# Patient Record
Sex: FEMALE | Race: WHITE | NOT HISPANIC OR LATINO | Employment: FULL TIME | URBAN - NONMETROPOLITAN AREA
[De-identification: names, ages, dates, MRNs, and addresses within clinical notes are randomized per-mention and may not be internally consistent; named-entity substitution may affect disease eponyms.]

---

## 2018-12-13 PROBLEM — I49.3 PVCS (PREMATURE VENTRICULAR CONTRACTIONS): Status: ACTIVE | Noted: 2018-12-13

## 2018-12-13 PROBLEM — I47.1 AVNRT (AV NODAL RE-ENTRY TACHYCARDIA) (HCC): Status: ACTIVE | Noted: 2018-12-13

## 2018-12-14 ENCOUNTER — CONSULT (OUTPATIENT)
Dept: CARDIOLOGY | Facility: CLINIC | Age: 53
End: 2018-12-14

## 2018-12-14 VITALS
WEIGHT: 158 LBS | SYSTOLIC BLOOD PRESSURE: 118 MMHG | BODY MASS INDEX: 25.39 KG/M2 | DIASTOLIC BLOOD PRESSURE: 76 MMHG | HEART RATE: 75 BPM | HEIGHT: 66 IN

## 2018-12-14 DIAGNOSIS — I49.3 PVCS (PREMATURE VENTRICULAR CONTRACTIONS): Primary | ICD-10-CM

## 2018-12-14 DIAGNOSIS — Z82.49 FAMILY HISTORY OF CAROTID ARTERY STENOSIS: ICD-10-CM

## 2018-12-14 DIAGNOSIS — I47.1 AVNRT (AV NODAL RE-ENTRY TACHYCARDIA) (HCC): ICD-10-CM

## 2018-12-14 PROCEDURE — 93000 ELECTROCARDIOGRAM COMPLETE: CPT | Performed by: INTERNAL MEDICINE

## 2018-12-14 PROCEDURE — 99203 OFFICE O/P NEW LOW 30 MIN: CPT | Performed by: INTERNAL MEDICINE

## 2018-12-14 RX ORDER — CONJUGATED ESTROGENS 0.3 MG/1
TABLET, FILM COATED ORAL
COMMUNITY
Start: 2018-11-08

## 2019-03-11 ENCOUNTER — HOSPITAL ENCOUNTER (OUTPATIENT)
Dept: CARDIOLOGY | Facility: HOSPITAL | Age: 54
End: 2019-03-11

## 2019-04-22 ENCOUNTER — HOSPITAL ENCOUNTER (OUTPATIENT)
Dept: CARDIOLOGY | Facility: HOSPITAL | Age: 54
End: 2019-04-22

## 2019-05-31 ENCOUNTER — TELEPHONE (OUTPATIENT)
Dept: CARDIOLOGY | Facility: CLINIC | Age: 54
End: 2019-05-31

## 2019-05-31 NOTE — TELEPHONE ENCOUNTER
Patient called to notify us that her PCP found that she is Vitamin D deficient and she is now taking VItamin D supplement 2000 IU

## 2019-12-06 ENCOUNTER — OFFICE VISIT (OUTPATIENT)
Dept: CARDIOLOGY | Facility: CLINIC | Age: 54
End: 2019-12-06

## 2019-12-06 VITALS
HEART RATE: 71 BPM | WEIGHT: 155 LBS | SYSTOLIC BLOOD PRESSURE: 125 MMHG | BODY MASS INDEX: 24.91 KG/M2 | DIASTOLIC BLOOD PRESSURE: 75 MMHG | HEIGHT: 66 IN

## 2019-12-06 DIAGNOSIS — I47.1 AVNRT (AV NODAL RE-ENTRY TACHYCARDIA) (HCC): ICD-10-CM

## 2019-12-06 DIAGNOSIS — I49.3 PVCS (PREMATURE VENTRICULAR CONTRACTIONS): Primary | ICD-10-CM

## 2019-12-06 PROCEDURE — 93000 ELECTROCARDIOGRAM COMPLETE: CPT | Performed by: INTERNAL MEDICINE

## 2019-12-06 PROCEDURE — 99213 OFFICE O/P EST LOW 20 MIN: CPT | Performed by: INTERNAL MEDICINE

## 2019-12-06 NOTE — PROGRESS NOTES
Olinda Mane  1965  [phone]    12/06/2019    [facility] CARDIOLOGY     Chirinos, Phyllis Kent, APRN  [address]  [place] [address]    Chief Complaint   Patient presents with   • pvcs       Problem List:  1.  PVCs:  a. Holter monitor, 07/19/2012, with heart rate of   BPM, average 70 BPM with no PACs or PVCs.  b. Event recorder, September 2012, with PVCs, diltiazem initiated.  c. GXT, 09/26/2012, with no ischemia.  d. Echocardiogram, 09/26/2012, with LA 3.1, EF 65%.  e. Event recorder, May 2013, with PVCs, flecainide initiated.  f. Flecainide stopped 2013  2. AVNRT:  a. 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  3. Wide complex tachycardia:  a. Left heart catheterization by Dr. Morrow, 11/21/2000, with normal RV-gram, normal coronary arteries, negative  methylergonovine provocation study.  4. Heat exhaustion, 2012.  5. GERD  6.  Anxiety.  7. Remote nephrolithiasis    Allergies  No Known Allergies    Current Medications    Current Outpatient Medications:   •  Cholecalciferol (VITAMIN D3) 125 MCG (5000 UT) capsule capsule, Take 5,000 Units by mouth Daily., Disp: , Rfl:   •  Loratadine (CLARITIN CHILDRENS PO), Take 5 mg by mouth Daily., Disp: , Rfl:   •  Misc Natural Products (ESTROVEN ENERGY PO), Take  by mouth., Disp: , Rfl:   •  PREMARIN 0.3 MG tablet, , Disp: , Rfl:   •  VITAMIN K PO, Take 100 mcg by mouth Daily., Disp: , Rfl:     History of Present Illness     Pt presents for follow up of PVCs. Since we last saw the pt, she has been doing well with only rare mild PVCs. She denies SOB, CP, LH, and dizziness, syncope. Denies any hospitalizations, ER visits, bleeding, or TIA/CVA symptoms. Overall feels well. Her PCP follows her lipids and she is doing lifestyle modification.     ROS:  General:  Denies fatigue, weight gain or loss  Cardiovascular:   "Denies CP, PND, syncope, near syncope, edema + rare palpitations.  Pulmonary:  Denies CABALLERO, cough, or wheezing      Vitals:    12/06/19 1012   BP: 125/75   BP Location: Right arm   Patient Position: Sitting   Pulse: 71   Weight: 70.3 kg (155 lb)   Height: 167.6 cm (66\")     Body mass index is 25.02 kg/m².  PE:  General: NAD. A & O x 3  Neck: no JVD, no carotid bruits, no TM  Heart RRR, NL S1, S2, S4 present, no rubs, murmurs  Lungs: CTA, no wheezes, rhonchi, or rales  Abd: soft, non-tender, NL BS  Ext: No musculoskeletal deformities, no edema, cyanosis, or clubbing  Psych: normal mood and affect    Diagnostic Data:        ECG 12 Lead  Date/Time: 12/6/2019 2:51 PM  Performed by: Chuck Cabrera MD  Authorized by: Chuck Cabrera MD   Comparison: compared with previous ECG from 12/14/2018  Similar to previous ECG  Rhythm: sinus rhythm  Rate: normal  BPM: 72    Clinical impression: normal ECG            1. PVCs (premature ventricular contractions)    2. AVNRT (AV cornelius re-entry tachycardia) (CMS/HCC)          Plan:  1. PVCs:  - previously on Flecainide, but now doing well on no AAD.   - continue lifestyle modification        2. AVNRT:  - s/p RFA in 2000  - no recurrences      F/up in 6 months    Electronically signed by YU Ferrari, 12/06/19, 10:38 AM.    "

## 2021-09-10 ENCOUNTER — OFFICE VISIT (OUTPATIENT)
Dept: CARDIOLOGY | Facility: CLINIC | Age: 56
End: 2021-09-10

## 2021-09-10 VITALS
HEART RATE: 62 BPM | HEIGHT: 66 IN | BODY MASS INDEX: 26.2 KG/M2 | DIASTOLIC BLOOD PRESSURE: 76 MMHG | WEIGHT: 163 LBS | SYSTOLIC BLOOD PRESSURE: 115 MMHG | OXYGEN SATURATION: 97 %

## 2021-09-10 DIAGNOSIS — I47.1 AVNRT (AV NODAL RE-ENTRY TACHYCARDIA) (HCC): ICD-10-CM

## 2021-09-10 DIAGNOSIS — I49.3 PVCS (PREMATURE VENTRICULAR CONTRACTIONS): Primary | ICD-10-CM

## 2021-09-10 PROCEDURE — 99213 OFFICE O/P EST LOW 20 MIN: CPT | Performed by: INTERNAL MEDICINE

## 2021-09-10 RX ORDER — PROGESTERONE 100 MG/1
100 CAPSULE ORAL DAILY
COMMUNITY
Start: 2021-08-13

## 2021-09-10 RX ORDER — PANTOPRAZOLE SODIUM 40 MG/1
40 TABLET, DELAYED RELEASE ORAL EVERY MORNING
COMMUNITY
Start: 2021-07-15

## 2021-09-10 NOTE — PROGRESS NOTES
Olinda Mane  1965  Home phone not available    09/10/2021    [facility] CARDIOLOGY     Referring Provider: No ref. provider found     Phyllis Chirinos, APRN  [address]  [place] [id number]    Chief Complaint   Patient presents with   • preamture ventricular contractions       Problem List:     1.  PVCs:  a. Holter monitor, 07/19/2012, with heart rate of   BPM, average 70 BPM with no PACs or PVCs.  b. Event recorder, September 2012, with PVCs, diltiazem initiated.  c. GXT, 09/26/2012, with no ischemia.  d. Echocardiogram, 09/26/2012, with LA 3.1, EF 65%.  e. Event recorder, May 2013, with PVCs, flecainide initiated.  f. Flecainide stopped 2013  2. AVNRT:  a. 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  3. Wide complex tachycardia:  a. Left heart catheterization by Dr. Morrow, 11/21/2000, with normal RV-gram, normal coronary arteries, negative  methylergonovine provocation study.  4. Heat exhaustion, 2012.  5. GERD  6.  Anxiety.  7. Remote nephrolithiasis      Allergies  No Known Allergies    Current Medications    Current Outpatient Medications:   •  Cholecalciferol (VITAMIN D3) 125 MCG (5000 UT) capsule capsule, Take 5,000 Units by mouth Daily., Disp: , Rfl:   •  Loratadine (CLARITIN CHILDRENS PO), Take 5 mg by mouth Daily., Disp: , Rfl:   •  pantoprazole (PROTONIX) 40 MG EC tablet, Take 40 mg by mouth Every Morning., Disp: , Rfl:   •  PREMARIN 0.3 MG tablet, , Disp: , Rfl:   •  Progesterone (PROMETRIUM) 100 MG capsule, Take 100 mg by mouth Daily., Disp: , Rfl:     History of Present Illness     Pt presents for follow up of PVC/SVT. Since we last saw the pt, pt denies any sustained palps, SOB, CP, LH, and dizziness. Denies any hospitalizations, ER visits, bleeding, or TIA/CVA symptoms. Overall feels well. Does have PVC on occasion usually associated with  "GERD. BP and HR stable    ROS:  General:  Denies fatigue, weight gain or loss  Cardiovascular:  Denies CP, PND, syncope, near syncope, edema or palpitations.  Pulmonary:  Denies CABALLERO, cough, or wheezing      Vitals:    09/10/21 1002   BP: 115/76   BP Location: Left arm   Patient Position: Sitting   Pulse: 62   SpO2: 97%   Weight: 73.9 kg (163 lb)   Height: 167.6 cm (66\")     Body mass index is 26.31 kg/m².  PE:  General: NAD  Neck: no JVD, no carotid bruits, no TM  Heart RRR, NL S1, S2,  no rubs, murmurs  Lungs: CTA, no wheezes, rhonchi, or rales  Abd: soft, non-tender, NL BS  Ext: No musculoskeletal deformities, no edema, cyanosis, or clubbing  Psych: normal mood and affect    Diagnostic Data:      Procedures    1. PVCs (premature ventricular contractions)    2. AVNRT (AV cornelius re-entry tachycardia) (CMS/HCC)          Plan:  1. PVCs: overall stable off AA  - continue lifestyle modification        2. AVNRT:  - s/p RFA in 2000  - no recurrences    F/up in 24 months      "